# Patient Record
Sex: FEMALE | Race: BLACK OR AFRICAN AMERICAN | NOT HISPANIC OR LATINO | URBAN - METROPOLITAN AREA
[De-identification: names, ages, dates, MRNs, and addresses within clinical notes are randomized per-mention and may not be internally consistent; named-entity substitution may affect disease eponyms.]

---

## 2018-12-15 ENCOUNTER — EMERGENCY (EMERGENCY)
Facility: HOSPITAL | Age: 58
LOS: 0 days | Discharge: HOME | End: 2018-12-15
Attending: EMERGENCY MEDICINE
Payer: MEDICARE

## 2018-12-15 VITALS
DIASTOLIC BLOOD PRESSURE: 65 MMHG | RESPIRATION RATE: 17 BRPM | HEART RATE: 69 BPM | SYSTOLIC BLOOD PRESSURE: 115 MMHG | TEMPERATURE: 98 F | OXYGEN SATURATION: 98 %

## 2018-12-15 VITALS
RESPIRATION RATE: 18 BRPM | WEIGHT: 158.07 LBS | DIASTOLIC BLOOD PRESSURE: 81 MMHG | HEART RATE: 87 BPM | OXYGEN SATURATION: 98 % | SYSTOLIC BLOOD PRESSURE: 116 MMHG | TEMPERATURE: 98 F

## 2018-12-15 DIAGNOSIS — R00.0 TACHYCARDIA, UNSPECIFIED: ICD-10-CM

## 2018-12-15 DIAGNOSIS — R00.2 PALPITATIONS: ICD-10-CM

## 2018-12-15 DIAGNOSIS — R51 HEADACHE: ICD-10-CM

## 2018-12-15 LAB
ALBUMIN SERPL ELPH-MCNC: 3.9 G/DL — SIGNIFICANT CHANGE UP (ref 3.3–5)
ALP SERPL-CCNC: 104 U/L — SIGNIFICANT CHANGE UP (ref 40–120)
ALT FLD-CCNC: 23 U/L — SIGNIFICANT CHANGE UP (ref 12–78)
ANION GAP SERPL CALC-SCNC: 8 MMOL/L — SIGNIFICANT CHANGE UP (ref 5–17)
AST SERPL-CCNC: 24 U/L — SIGNIFICANT CHANGE UP (ref 15–37)
BILIRUB SERPL-MCNC: 0.3 MG/DL — SIGNIFICANT CHANGE UP (ref 0.2–1.2)
BUN SERPL-MCNC: 10 MG/DL — SIGNIFICANT CHANGE UP (ref 7–23)
CALCIUM SERPL-MCNC: 9.2 MG/DL — SIGNIFICANT CHANGE UP (ref 8.5–10.1)
CHLORIDE SERPL-SCNC: 105 MMOL/L — SIGNIFICANT CHANGE UP (ref 96–108)
CK MB BLD-MCNC: <0.8 % — SIGNIFICANT CHANGE UP (ref 0–3.5)
CK MB CFR SERPL CALC: <1 NG/ML — SIGNIFICANT CHANGE UP (ref 0.5–3.6)
CK SERPL-CCNC: 131 U/L — SIGNIFICANT CHANGE UP (ref 26–192)
CO2 SERPL-SCNC: 29 MMOL/L — SIGNIFICANT CHANGE UP (ref 22–31)
CREAT SERPL-MCNC: 0.92 MG/DL — SIGNIFICANT CHANGE UP (ref 0.5–1.3)
D DIMER BLD IA.RAPID-MCNC: 269 NG/ML DDU — HIGH
GLUCOSE SERPL-MCNC: 87 MG/DL — SIGNIFICANT CHANGE UP (ref 70–99)
HCT VFR BLD CALC: 36.2 % — SIGNIFICANT CHANGE UP (ref 34.5–45)
HGB BLD-MCNC: 11.9 G/DL — SIGNIFICANT CHANGE UP (ref 11.5–15.5)
MCHC RBC-ENTMCNC: 30.2 PG — SIGNIFICANT CHANGE UP (ref 27–34)
MCHC RBC-ENTMCNC: 32.9 GM/DL — SIGNIFICANT CHANGE UP (ref 32–36)
MCV RBC AUTO: 91.9 FL — SIGNIFICANT CHANGE UP (ref 80–100)
NRBC # BLD: 0 /100 WBCS — SIGNIFICANT CHANGE UP (ref 0–0)
PLATELET # BLD AUTO: 469 K/UL — HIGH (ref 150–400)
POTASSIUM SERPL-MCNC: 3.5 MMOL/L — SIGNIFICANT CHANGE UP (ref 3.5–5.3)
POTASSIUM SERPL-SCNC: 3.5 MMOL/L — SIGNIFICANT CHANGE UP (ref 3.5–5.3)
PROT SERPL-MCNC: 8 GM/DL — SIGNIFICANT CHANGE UP (ref 6–8.3)
RBC # BLD: 3.94 M/UL — SIGNIFICANT CHANGE UP (ref 3.8–5.2)
RBC # FLD: 14 % — SIGNIFICANT CHANGE UP (ref 10.3–14.5)
SODIUM SERPL-SCNC: 142 MMOL/L — SIGNIFICANT CHANGE UP (ref 135–145)
TROPONIN I SERPL-MCNC: <.015 NG/ML — SIGNIFICANT CHANGE UP (ref 0.01–0.04)
TROPONIN I SERPL-MCNC: <.015 NG/ML — SIGNIFICANT CHANGE UP (ref 0.01–0.04)
WBC # BLD: 4.63 K/UL — SIGNIFICANT CHANGE UP (ref 3.8–10.5)
WBC # FLD AUTO: 4.63 K/UL — SIGNIFICANT CHANGE UP (ref 3.8–10.5)

## 2018-12-15 PROCEDURE — 71045 X-RAY EXAM CHEST 1 VIEW: CPT | Mod: 26

## 2018-12-15 PROCEDURE — 99285 EMERGENCY DEPT VISIT HI MDM: CPT

## 2018-12-15 PROCEDURE — 70450 CT HEAD/BRAIN W/O DYE: CPT | Mod: 26

## 2018-12-15 PROCEDURE — 71275 CT ANGIOGRAPHY CHEST: CPT | Mod: 26

## 2018-12-15 PROCEDURE — 93010 ELECTROCARDIOGRAM REPORT: CPT

## 2018-12-15 RX ORDER — SODIUM CHLORIDE 9 MG/ML
1000 INJECTION INTRAMUSCULAR; INTRAVENOUS; SUBCUTANEOUS ONCE
Qty: 0 | Refills: 0 | Status: COMPLETED | OUTPATIENT
Start: 2018-12-15 | End: 2018-12-15

## 2018-12-15 RX ORDER — ACETAMINOPHEN 500 MG
650 TABLET ORAL ONCE
Qty: 0 | Refills: 0 | Status: COMPLETED | OUTPATIENT
Start: 2018-12-15 | End: 2018-12-15

## 2018-12-15 RX ORDER — METOCLOPRAMIDE HCL 10 MG
10 TABLET ORAL ONCE
Qty: 0 | Refills: 0 | Status: COMPLETED | OUTPATIENT
Start: 2018-12-15 | End: 2018-12-15

## 2018-12-15 RX ADMIN — SODIUM CHLORIDE 1000 MILLILITER(S): 9 INJECTION INTRAMUSCULAR; INTRAVENOUS; SUBCUTANEOUS at 18:41

## 2018-12-15 RX ADMIN — Medication 10 MILLIGRAM(S): at 18:41

## 2018-12-15 RX ADMIN — Medication 650 MILLIGRAM(S): at 18:41

## 2018-12-15 NOTE — ED PROVIDER NOTE - OBJECTIVE STATEMENT
57yo female with no pertinent pmh, psh: hysterectomy presents with frontal headache since yesterday, episodes of feeling unsteady, then had some left sharp arm pain and some fluttering in chest. Pt states thought she was tired, but it happened again today. symptoms resolved except for mild frontal headache now.     ROS: No fever/chills. No photophobia/eye pain/changes in vision, No ear pain/sore throat/dysphagia, No chest pain/+palpitations. No SOB/cough/stridor. No abdominal pain, N/V/D, no black/bloody bm. No dysuria/frequency/discharge, + headache. + Dizziness.  No rash.  No numbness/tingling/weakness. 57yo female with no pertinent pmh, psh: hysterectomy presents with frontal headache since yesterday, episodes of feeling unsteady, then had some left sharp arm pain and some fluttering in chest. Pt states thought she was tired, but it happened again today. symptoms resolved except for mild frontal headache now. denies cp.    ROS: No fever/chills. No photophobia/eye pain/changes in vision, No ear pain/sore throat/dysphagia, No chest pain/+palpitations. No SOB/cough/stridor. No abdominal pain, N/V/D, no black/bloody bm. No dysuria/frequency/discharge, + headache. + Dizziness.  No rash.  No numbness/tingling/weakness.

## 2018-12-15 NOTE — ED PROVIDER NOTE - PROGRESS NOTE DETAILS
Patient received in sign out from Dr. Yun pending work up of mild frontal headache and episode of fluttering yesterday.  Labs reviewed, serial trops and EKGs are unchanged, repeat EKG shows NSR, rate 66, no st e/d, no twi.  Patient's CT scans do not demonstrate acute pathology.  Sitting up smiling, feels great at this time.  Incidental findings of pulmonary nodule discussed, patient understands that it needs follow up with her physician.  Discussed results and outcome of today's visit with the patient.  Patient advised to please follow up with another healthcare provider within the next 24 hours and return to the Emergency Department for worsening symptoms or any other concerns.  Patient advised that their doctor may call  to follow up on the specific results of the tests performed today in the emergency department.   Patient appears well on discharge.

## 2018-12-15 NOTE — ED PROVIDER NOTE - PHYSICAL EXAMINATION
Gen: Alert, Well appearing. NAD    Head: NC, AT, PERRL, EOMI, normal lids/conjunctiva   ENT: Bilateral TM WNL, normal hearing, patent oropharynx without erythema/exudate, uvula midline  Neck: supple, no tenderness/meningismus/JVD   Pulm: Bilateral clear BS, normal resp effort, no wheeze/stridor/retractions  CV: RRR, no M/R/G, +dist pulses   Abd: soft, NT/ND, +BS, no guarding/rebound tenderness  Mskel: no edema/erythema/cyanosis   Skin: no rash   Neuro: AAOx3, no sensory/motor deficits, CN2-12 intact, No pronator drift. Normal finger to nose, heel to shin. No dysdiadochokinesis. Full power and sensation intact. Gait WNL.

## 2018-12-15 NOTE — ED ADULT TRIAGE NOTE - CHIEF COMPLAINT QUOTE
pt c/o Left arm pain +flutter of the heart intermittently  + headache +shortness of breath denies CP +Nausea

## 2018-12-15 NOTE — ED PROVIDER NOTE - MEDICAL DECISION MAKING DETAILS
pt comfortable, asymptomatic. pending repeat CE. Patient signed out to incoming physician.  All decisions regarding the progression of care will be made at their discretion.

## 2018-12-15 NOTE — ED ADULT NURSE NOTE - OBJECTIVE STATEMENT
PT STATES " Yesterday I began feeling a fluttering in my heart, pain in right arm, with unbalanced gait, nausea, and a frontal headache."

## 2019-09-08 ENCOUNTER — EMERGENCY (EMERGENCY)
Facility: HOSPITAL | Age: 59
LOS: 0 days | Discharge: HOME | End: 2019-09-08
Attending: EMERGENCY MEDICINE
Payer: COMMERCIAL

## 2019-09-08 VITALS
HEIGHT: 69 IN | WEIGHT: 160.06 LBS | RESPIRATION RATE: 18 BRPM | SYSTOLIC BLOOD PRESSURE: 137 MMHG | HEART RATE: 73 BPM | OXYGEN SATURATION: 99 % | DIASTOLIC BLOOD PRESSURE: 75 MMHG

## 2019-09-08 DIAGNOSIS — H57.89 OTHER SPECIFIED DISORDERS OF EYE AND ADNEXA: ICD-10-CM

## 2019-09-08 DIAGNOSIS — L29.9 PRURITUS, UNSPECIFIED: ICD-10-CM
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
## 2019-09-08 DIAGNOSIS — T15.91XA FOREIGN BODY ON EXTERNAL EYE, PART UNSPECIFIED, RIGHT EYE, INITIAL ENCOUNTER: ICD-10-CM

## 2019-09-08 PROCEDURE — 99283 EMERGENCY DEPT VISIT LOW MDM: CPT | Mod: 25

## 2019-09-08 PROCEDURE — 65205 REMOVE FOREIGN BODY FROM EYE: CPT

## 2019-09-08 RX ORDER — FLUORESCEIN SODIUM 9 MG
1 STRIP OPHTHALMIC (EYE) ONCE
Refills: 0 | Status: COMPLETED | OUTPATIENT
Start: 2019-09-08 | End: 2019-09-08

## 2019-09-08 RX ORDER — IBUPROFEN 200 MG
600 TABLET ORAL ONCE
Refills: 0 | Status: COMPLETED | OUTPATIENT
Start: 2019-09-08 | End: 2019-09-08

## 2019-09-08 RX ADMIN — Medication 1 DROP(S): at 06:56

## 2019-09-08 RX ADMIN — Medication 1 APPLICATION(S): at 06:55

## 2019-09-08 NOTE — ED PROVIDER NOTE - OBJECTIVE STATEMENT
58 yo F with R eye redness.  Pt. explains contact lens is stuck in her eye.  She tried multiple times to get it out.  It's itchy.  No other complaints or trauma.  She feels well otherwise.   ROS: negative for fever, cough, headache, chest pain, shortness of breath, abd pain, nausea, vomiting, diarrhea, rash, paresthesia, and weakness--all other systems reviewed are negative.   PMH: negative; Meds: Denies; SH: Denies smoking/drinking/drug use

## 2019-09-08 NOTE — ED PROCEDURE NOTE - PROCEDURE ADDITIONAL DETAILS
R eye anesthetized with tetracaine, fluorescein applied, examined with blue light, no abrasion  FB identified in R eye, removed with gloved hand, pinched from sides  initial attempt with cotton swab unsuccessful  FB removed, pt. comfortable and happy, vision intact and at baseline b/l

## 2019-09-08 NOTE — ED PROVIDER NOTE - PHYSICAL EXAMINATION
Vitals: WNL  Gen: AAOx3, NAD, sitting comfortably in stretcher  Head: ncat, perrla, eomi b/l, fluorescin exam shows no abrasions, contact lens visualized in R eye  Neck: supple, no lymphadenopathy, no midline deviation  Heart: rrr, no m/r/g  Lungs: CTA b/l, no rales/ronchi/wheezes  Abd: soft, nontender, non-distended, no rebound or guarding  Ext: no clubbing/cyanosis/edema  Neuro: sensation and muscle strength intact b/l, steady gait

## 2019-09-08 NOTE — ED ADULT TRIAGE NOTE - CHIEF COMPLAINT QUOTE
patient states "I cant take my contact out of my eye for an hour" + red  right conjunctiva. Came back from [facility] 8/28/19 for medial mission. Denies vison change, cough, fever patient states "I cant take my contact out of my eye for an hour" + red  right conjunctiva. Came back from [facility] 8/28/19 for medial mission. Denies vison change, cough, fever. patient states "I cant take my contact out of my eye for an hour" + red  right conjunctiva. Came back from [facility] 8/28/19 for medial mission. Denies vison change, cough, fever, chest pain, SOB

## 2019-09-08 NOTE — ED PROVIDER NOTE - NSFOLLOWUPCLINICS_GEN_ALL_ED_FT
[facility] - Ophthalmology  Ophthalmology  [address], [address]  [place] [address]  Phone: [phone]  Fax:   Follow Up Time: 1-3 Days

## 2019-09-08 NOTE — ED PROVIDER NOTE - PROGRESS NOTE DETAILS
FB removed after analgesia with tetracain  Pt. reports feeling better after meds  pt. agrees to f/u with primary care outpt., referred to ophtho for f/u   pt. understands to return to ED if symptoms worsen; will d/c

## 2019-09-08 NOTE — ED PROVIDER NOTE - CLINICAL SUMMARY MEDICAL DECISION MAKING FREE TEXT BOX
58 yo F with contact lens stuck in R eye, r/o abrasion, remove fb  -tetracaine, fluorecin exam, motrin prn  -remove FB  -d/c with ophtho f/u and antbx to cover for conjunctivitis

## 2019-09-08 NOTE — ED ADULT NURSE NOTE - OBJECTIVE STATEMENT
Patient came into the ER with chief complaint of unable to take her contact lenses out. Noted the  right eye is red, no drainage. Right eye looks very irritated.

## 2019-09-08 NOTE — ED PROVIDER NOTE - PATIENT PORTAL LINK FT
You can access the FollowMyHealth Patient Portal offered by [facility] by registering at the following website: [email or web address]. By joining [employer]’s FollowMyHealth portal, you will also be able to view your health information using other applications (apps) compatible with our system.

## 2019-09-08 NOTE — ED ADULT NURSE NOTE - CHIEF COMPLAINT QUOTE
patient states "I cant take my contact out of my eye for an hour" + red  right conjunctiva. Came back from [facility] 8/28/19 for medial mission. Denies vison change, cough, fever, chest pain, SOB

## 2020-04-25 ENCOUNTER — MESSAGE (OUTPATIENT)
Age: 60
End: 2020-04-25

## 2022-06-23 ENCOUNTER — OTHER- (OUTPATIENT)
Dept: URBAN - METROPOLITAN AREA CLINIC 94 | Facility: CLINIC | Age: 62
Setting detail: DERMATOLOGY
End: 2022-06-23

## 2022-06-23 DIAGNOSIS — D48.5 NEOPLASM OF UNCERTAIN BEHAVIOR OF SKIN: ICD-10-CM

## 2022-06-23 PROCEDURE — 11102 TANGNTL BX SKIN SINGLE LES: CPT

## 2023-08-16 NOTE — ED ADULT NURSE NOTE - TETANUS
If you are a smoker, it is important for your health to stop smoking. Please be aware that second hand smoke is also harmful.
unknown
